# Patient Record
Sex: MALE | Race: WHITE | HISPANIC OR LATINO | Employment: UNEMPLOYED | URBAN - NONMETROPOLITAN AREA
[De-identification: names, ages, dates, MRNs, and addresses within clinical notes are randomized per-mention and may not be internally consistent; named-entity substitution may affect disease eponyms.]

---

## 2024-09-29 ENCOUNTER — HOSPITAL ENCOUNTER (EMERGENCY)
Facility: HOSPITAL | Age: 34
Discharge: HOME | End: 2024-09-29
Attending: STUDENT IN AN ORGANIZED HEALTH CARE EDUCATION/TRAINING PROGRAM

## 2024-09-29 VITALS
DIASTOLIC BLOOD PRESSURE: 98 MMHG | WEIGHT: 205 LBS | RESPIRATION RATE: 17 BRPM | BODY MASS INDEX: 29.35 KG/M2 | TEMPERATURE: 98 F | OXYGEN SATURATION: 97 % | HEIGHT: 70 IN | HEART RATE: 84 BPM | SYSTOLIC BLOOD PRESSURE: 148 MMHG

## 2024-09-29 DIAGNOSIS — R11.0 NAUSEA: Primary | ICD-10-CM

## 2024-09-29 DIAGNOSIS — R51.9 NONINTRACTABLE HEADACHE, UNSPECIFIED CHRONICITY PATTERN, UNSPECIFIED HEADACHE TYPE: ICD-10-CM

## 2024-09-29 PROCEDURE — 25000003 PHARM REV CODE 250: Performed by: CLINICAL NURSE SPECIALIST

## 2024-09-29 PROCEDURE — 99284 EMERGENCY DEPT VISIT MOD MDM: CPT

## 2024-09-29 RX ORDER — ONDANSETRON 4 MG/1
4 TABLET, ORALLY DISINTEGRATING ORAL
Status: COMPLETED | OUTPATIENT
Start: 2024-09-29 | End: 2024-09-29

## 2024-09-29 RX ORDER — ONDANSETRON 4 MG/1
4 TABLET, ORALLY DISINTEGRATING ORAL EVERY 6 HOURS PRN
Qty: 10 TABLET | Refills: 0 | Status: SHIPPED | OUTPATIENT
Start: 2024-09-29

## 2024-09-29 RX ORDER — LISINOPRIL 2.5 MG/1
TABLET ORAL DAILY
COMMUNITY

## 2024-09-29 RX ORDER — BUTALBITAL, ACETAMINOPHEN AND CAFFEINE 50; 325; 40 MG/1; MG/1; MG/1
1 TABLET ORAL EVERY 4 HOURS PRN
Qty: 10 TABLET | Refills: 0 | Status: SHIPPED | OUTPATIENT
Start: 2024-09-29 | End: 2024-10-29

## 2024-09-29 RX ORDER — BUTALBITAL, ACETAMINOPHEN AND CAFFEINE 50; 325; 40 MG/1; MG/1; MG/1
2 TABLET ORAL ONCE
Status: COMPLETED | OUTPATIENT
Start: 2024-09-29 | End: 2024-09-29

## 2024-09-29 RX ADMIN — BUTALBITAL, ACETAMINOPHEN, AND CAFFEINE 2 TABLET: 325; 50; 40 TABLET ORAL at 07:09

## 2024-09-29 RX ADMIN — ONDANSETRON 4 MG: 4 TABLET, ORALLY DISINTEGRATING ORAL at 07:09

## 2024-09-30 NOTE — ED PROVIDER NOTES
Encounter Date: 9/29/2024       History     Chief Complaint   Patient presents with    Headache     Patient reports headache and nausea. Noted HTN for the past 6 days. Takes Lisinopril as prescribed. Denies chest pain/sob at this time.     Nausea     34-year-old male presents emergency room with headache and nausea.  Patient was concerned about elevated blood pressure.  Blood pressure in triage was 148/98.  Has a prescription for lisinopril.  Denies any shortness of breath, chest pain, palpitations        Review of patient's allergies indicates:  No Known Allergies  Past Medical History:   Diagnosis Date    Hypertension      History reviewed. No pertinent surgical history.  No family history on file.  Social History     Tobacco Use    Smoking status: Unknown     Review of Systems   Constitutional:  Negative for fever.   HENT:  Negative for sore throat.    Respiratory:  Negative for shortness of breath.    Cardiovascular:  Negative for chest pain.   Gastrointestinal:  Positive for nausea.   Genitourinary:  Negative for dysuria.   Musculoskeletal:  Negative for back pain.   Skin:  Negative for rash.   Neurological:  Positive for headaches. Negative for weakness.   Hematological:  Does not bruise/bleed easily.   All other systems reviewed and are negative.      Physical Exam     Initial Vitals [09/29/24 1913]   BP Pulse Resp Temp SpO2   (!) 148/98 84 17 98.2 °F (36.8 °C) 97 %      MAP       --         Physical Exam    Nursing note and vitals reviewed.  Constitutional: He appears well-developed and well-nourished.   HENT:   Head: Normocephalic and atraumatic.   Eyes: Pupils are equal, round, and reactive to light.   Neck:   Normal range of motion.  Cardiovascular:  Normal rate and regular rhythm.           Pulmonary/Chest: Breath sounds normal.   Abdominal: Abdomen is soft. Bowel sounds are normal.   Musculoskeletal:         General: Normal range of motion.      Cervical back: Normal range of motion.     Neurological:  He is alert and oriented to person, place, and time.   Psychiatric: He has a normal mood and affect.         ED Course   Procedures  Labs Reviewed - No data to display       Imaging Results    None          Medications   butalbital-acetaminophen-caffeine -40 mg per tablet 2 tablet (2 tablets Oral Given 9/29/24 1936)   ondansetron disintegrating tablet 4 mg (4 mg Oral Given 9/29/24 1936)     Medical Decision Making                                    Clinical Impression:  Final diagnoses:  [R11.0] Nausea (Primary)  [R51.9] Nonintractable headache, unspecified chronicity pattern, unspecified headache type          ED Disposition Condition    Discharge Stable          ED Prescriptions       Medication Sig Dispense Start Date End Date Auth. Provider    ondansetron (ZOFRAN-ODT) 4 MG TbDL Take 1 tablet (4 mg total) by mouth every 6 (six) hours as needed. 10 tablet 9/29/2024 -- Delmis Nicole NP    butalbital-acetaminophen-caffeine -40 mg (FIORICET, ESGIC) -40 mg per tablet Take 1 tablet by mouth every 4 (four) hours as needed. 10 tablet 9/29/2024 10/29/2024 Delmis Nicole NP          Follow-up Information    None          Delmis Nicole NP  09/29/24 1937